# Patient Record
Sex: MALE | Race: WHITE | NOT HISPANIC OR LATINO | Employment: OTHER | URBAN - METROPOLITAN AREA
[De-identification: names, ages, dates, MRNs, and addresses within clinical notes are randomized per-mention and may not be internally consistent; named-entity substitution may affect disease eponyms.]

---

## 2023-06-07 ENCOUNTER — OFFICE VISIT (OUTPATIENT)
Dept: URGENT CARE | Facility: CLINIC | Age: 78
End: 2023-06-07
Payer: MEDICARE

## 2023-06-07 VITALS
RESPIRATION RATE: 18 BRPM | HEART RATE: 92 BPM | OXYGEN SATURATION: 92 % | SYSTOLIC BLOOD PRESSURE: 150 MMHG | DIASTOLIC BLOOD PRESSURE: 67 MMHG | TEMPERATURE: 97.7 F

## 2023-06-07 DIAGNOSIS — H93.12 TINNITUS OF LEFT EAR: ICD-10-CM

## 2023-06-07 DIAGNOSIS — R42 DIZZINESS AND GIDDINESS: ICD-10-CM

## 2023-06-07 DIAGNOSIS — J02.9 SORE THROAT: ICD-10-CM

## 2023-06-07 DIAGNOSIS — H92.02 OTALGIA OF LEFT EAR: Primary | ICD-10-CM

## 2023-06-07 PROCEDURE — 99203 OFFICE O/P NEW LOW 30 MIN: CPT | Performed by: PHYSICIAN ASSISTANT

## 2023-06-07 PROCEDURE — G0463 HOSPITAL OUTPT CLINIC VISIT: HCPCS | Performed by: PHYSICIAN ASSISTANT

## 2023-06-07 RX ORDER — BUDESONIDE, GLYCOPYRROLATE, AND FORMOTEROL FUMARATE 160; 9; 4.8 UG/1; UG/1; UG/1
AEROSOL, METERED RESPIRATORY (INHALATION)
COMMUNITY

## 2023-06-07 RX ORDER — DILTIAZEM HYDROCHLORIDE 180 MG/1
CAPSULE, COATED, EXTENDED RELEASE ORAL
COMMUNITY
Start: 2023-04-03

## 2023-06-07 RX ORDER — LISINOPRIL 40 MG/1
TABLET ORAL
COMMUNITY
Start: 2023-04-25

## 2023-06-07 RX ORDER — ROSUVASTATIN CALCIUM 40 MG/1
TABLET, COATED ORAL
COMMUNITY
Start: 2023-04-25

## 2023-06-07 RX ORDER — ALLOPURINOL 100 MG/1
TABLET ORAL
COMMUNITY
Start: 2023-04-25

## 2023-06-07 RX ORDER — IPRATROPIUM BROMIDE 21 UG/1
SPRAY, METERED NASAL
COMMUNITY

## 2023-06-07 RX ORDER — GLIPIZIDE 5 MG/1
TABLET ORAL
COMMUNITY

## 2023-06-07 RX ORDER — MELOXICAM 15 MG/1
TABLET ORAL
COMMUNITY
Start: 2023-04-25

## 2023-06-07 RX ORDER — ALBUTEROL SULFATE 90 UG/1
AEROSOL, METERED RESPIRATORY (INHALATION)
COMMUNITY
Start: 2023-03-22

## 2023-06-07 RX ORDER — OMEPRAZOLE 40 MG/1
CAPSULE, DELAYED RELEASE ORAL
COMMUNITY
Start: 2023-04-03

## 2023-06-07 NOTE — PROGRESS NOTES
[facility]        NAME: Amirah Mendoza is a 69 y o  male  : 1945    MRN: [id number]  DATE: 2023  TIME: 9:29 AM    Assessment and Plan   Otalgia of left ear [H92 02]  1  Otalgia of left ear        2  Tinnitus of left ear        3  Sore throat        4  Dizziness and giddiness          Reviewed with patient that give chronic and ongoing nature of symptoms as well as lack of improvement with antibiotic and steroids there is nothing more that can be done from  and that he needs to see ENT as his PCP has also recommended     Patient Instructions       Follow up with PCP in 3-5 days  Proceed to  ER if symptoms worsen  Chief Complaint     Chief Complaint   Patient presents with   • Sore Throat     USED A ZPACK LAST WEEK, NO IMPROVEMENT, ON GOING FOR A COUPLE OF WEEKS   • Earache     STARTED ABOUT 2 MONTHS AGO, RINGING IN LEFT EAR   • Dizziness     FOR 3 MONTHS         History of Present Illness       Patient is a 70 yo male who presents for evaluation of ongoing sore throat and left-sided ear pain  Patient states he has had sore throat for a couple of weeks  He took a Z-mayda last week but symptoms persisted  He also states he has had pain and ringing in his left ear for 2 months  He also reports 3 months of dizziness  He has also tried Prednisone for these symptoms with no improvement  He has been seen by his PCP for these issues and they recommended he see ENT but his appointment is not until July so he came to  today to be seen  Denies fevers, SOB, CP  Also denies headaches and any facial or ectremity numbness or weakness  Review of Systems   Review of Systems   Constitutional: Negative for fever  HENT: Positive for ear pain and sore throat  Negative for ear discharge, hearing loss, trouble swallowing and voice change  Respiratory: Negative for shortness of breath  Cardiovascular: Negative for chest pain  Neurological: Positive for dizziness  Negative for headaches  Current Medications       Current Outpatient Medications:   •  albuterol (PROVENTIL HFA,VENTOLIN HFA) 90 mcg/act inhaler, , Disp: , Rfl:   •  allopurinol (ZYLOPRIM) 100 mg tablet, , Disp: , Rfl:   •  Budeson-Glycopyrrol-Formoterol (Breztri Aerosphere) 160-9-4 8 MCG/ACT AERO, Inhale, Disp: , Rfl:   •  diltiazem (CARDIZEM CD) 180 mg 24 hr capsule, , Disp: , Rfl:   •  glipiZIDE (GLUCOTROL) 5 mg tablet, Take by mouth, Disp: , Rfl:   •  ipratropium (ATROVENT) 0 03 % nasal spray, Use, Disp: , Rfl:   •  lisinopril (ZESTRIL) 40 mg tablet, , Disp: , Rfl:   •  meloxicam (MOBIC) 15 mg tablet, , Disp: , Rfl:   •  omeprazole (PriLOSEC) 40 MG capsule, , Disp: , Rfl:   •  rosuvastatin (CRESTOR) 40 MG tablet, , Disp: , Rfl:     Current Allergies     Allergies as of 06/07/2023 - never reviewed   Allergen Reaction Noted   • Shellfish allergy - food allergy Hives and Swelling 10/25/2022            The following portions of the patient's history were reviewed and updated as appropriate: allergies, current medications, past family history, past medical history, past social history, past surgical history and problem list      No past medical history on file  No past surgical history on file  No family history on file  Medications have been verified  Objective   /67   Pulse 92   Temp 97 7 °F (36 5 °C)   Resp 18   SpO2 92%        Physical Exam     Physical Exam  Constitutional:       General: He is not in acute distress  Appearance: He is not toxic-appearing  HENT:      Ears:      Comments: Bilateral EACs clear  Bilateral TMs grey, translucent, bony landmarks visible with no fluid   Eyes:      Comments: No OP edema or erythema  Tonsils normal size  Uvula midline  Cardiovascular:      Rate and Rhythm: Normal rate  Pulmonary:      Effort: Pulmonary effort is normal    Lymphadenopathy:      Cervical: No cervical adenopathy  Skin:     General: Skin is warm and dry     Neurological:      Mental Status: He is alert  Comments: CN II-XII intact   Romberg negative    Psychiatric:         Mood and Affect: Mood normal          Behavior: Behavior normal